# Patient Record
Sex: FEMALE | Race: WHITE | NOT HISPANIC OR LATINO | Employment: OTHER | URBAN - METROPOLITAN AREA
[De-identification: names, ages, dates, MRNs, and addresses within clinical notes are randomized per-mention and may not be internally consistent; named-entity substitution may affect disease eponyms.]

---

## 2023-09-22 ENCOUNTER — TELEPHONE (OUTPATIENT)
Dept: ENDOCRINOLOGY | Facility: HOSPITAL | Age: 43
End: 2023-09-22
Payer: COMMERCIAL

## 2023-10-09 ENCOUNTER — TELEPHONE (OUTPATIENT)
Dept: ENDOCRINOLOGY | Facility: CLINIC | Age: 43
End: 2023-10-09
Payer: COMMERCIAL

## 2023-10-09 NOTE — TELEPHONE ENCOUNTER
Most recent labs ordered have been pulled into system from Care everywhere. TSH 11.1. Results routed to Dr. Saúl Aceves LPN

## 2023-10-13 DIAGNOSIS — E03.9 HYPOTHYROIDISM, UNSPECIFIED TYPE: ICD-10-CM

## 2023-10-13 RX ORDER — POTASSIUM CHLORIDE 750 MG/1
10 TABLET, FILM COATED, EXTENDED RELEASE ORAL
COMMUNITY
Start: 2021-03-08 | End: 2023-11-27 | Stop reason: SDUPTHER

## 2023-10-13 RX ORDER — VIT C/E/ZN/COPPR/LUTEIN/ZEAXAN 250MG-90MG
25 CAPSULE ORAL DAILY
COMMUNITY
Start: 2021-03-08

## 2023-10-13 RX ORDER — CROMOLYN SODIUM 100 MG/5ML
15 SOLUTION, CONCENTRATE ORAL
COMMUNITY
Start: 2023-09-15 | End: 2023-10-19 | Stop reason: SDUPTHER

## 2023-10-13 RX ORDER — FLUDROCORTISONE ACETATE 0.1 MG/1
0.05 TABLET ORAL 2 TIMES DAILY
COMMUNITY
Start: 2018-12-03 | End: 2024-02-01 | Stop reason: SDUPTHER

## 2023-10-13 RX ORDER — PROPRANOLOL HYDROCHLORIDE 10 MG/1
10 TABLET ORAL 2 TIMES DAILY
COMMUNITY
Start: 2020-04-27

## 2023-10-13 RX ORDER — LEVOTHYROXINE SODIUM 75 UG/1
75 TABLET ORAL
Qty: 90 TABLET | Refills: 1 | Status: SHIPPED | OUTPATIENT
Start: 2023-10-13 | End: 2024-05-06

## 2023-10-13 RX ORDER — LEVOTHYROXINE SODIUM 75 UG/1
75 TABLET ORAL
COMMUNITY
Start: 2020-10-23 | End: 2023-10-13 | Stop reason: SDUPTHER

## 2023-10-13 NOTE — TELEPHONE ENCOUNTER
Spoke to Anastasia Plaza asking for new script for increased dose she spoke to provider about .. Script for 75 mcg of synthroid pended to provider . Sasha Aceves LPN

## 2023-10-18 ENCOUNTER — DOCUMENTATION (OUTPATIENT)
Dept: ENDOCRINOLOGY | Facility: CLINIC | Age: 43
End: 2023-10-18
Payer: COMMERCIAL

## 2023-10-18 PROBLEM — E06.3 HYPOTHYROIDISM DUE TO HASHIMOTO'S THYROIDITIS: Status: ACTIVE | Noted: 2019-01-24

## 2023-10-18 PROBLEM — E55.9 VITAMIN D DEFICIENCY: Status: ACTIVE | Noted: 2019-01-24

## 2023-10-18 PROBLEM — R42 DIZZINESS: Status: ACTIVE | Noted: 2023-10-18

## 2023-10-18 PROBLEM — R92.30 DENSE BREAST TISSUE: Status: ACTIVE | Noted: 2017-05-01

## 2023-10-18 PROBLEM — R53.1 GENERALIZED WEAKNESS: Status: ACTIVE | Noted: 2023-10-18

## 2023-10-18 PROBLEM — E03.8 HYPOTHYROIDISM DUE TO HASHIMOTO'S THYROIDITIS: Status: ACTIVE | Noted: 2019-01-24

## 2023-10-18 PROBLEM — R79.89 LOW VITAMIN D LEVEL: Status: ACTIVE | Noted: 2023-08-14

## 2023-10-18 PROBLEM — E03.9 ACQUIRED HYPOTHYROIDISM: Status: ACTIVE | Noted: 2023-10-18

## 2023-10-18 PROBLEM — N20.0 CALCULUS OF KIDNEY: Status: ACTIVE | Noted: 2019-01-24

## 2023-10-18 PROBLEM — M53.0 CERVICOCRANIAL SYNDROME: Status: ACTIVE | Noted: 2019-11-15

## 2023-10-18 PROBLEM — E04.1 THYROID NODULE: Status: ACTIVE | Noted: 2021-09-22

## 2023-10-18 PROBLEM — E83.42 HYPOMAGNESEMIA: Status: ACTIVE | Noted: 2018-11-19

## 2023-10-18 PROBLEM — G90.A POTS (POSTURAL ORTHOSTATIC TACHYCARDIA SYNDROME): Status: ACTIVE | Noted: 2023-10-18

## 2023-10-18 PROBLEM — Z86.39 HISTORY OF HASHIMOTO THYROIDITIS: Status: ACTIVE | Noted: 2023-10-18

## 2023-10-18 PROBLEM — R00.2 PALPITATIONS: Status: ACTIVE | Noted: 2023-10-18

## 2023-10-18 PROBLEM — E87.6 HYPOKALEMIA: Status: ACTIVE | Noted: 2018-11-19

## 2023-10-18 RX ORDER — ASPIRIN 325 MG
1 TABLET, DELAYED RELEASE (ENTERIC COATED) ORAL
COMMUNITY
Start: 2016-06-09 | End: 2023-10-19 | Stop reason: ALTCHOICE

## 2023-10-18 RX ORDER — ACETAMINOPHEN 500 MG
500 TABLET ORAL EVERY 8 HOURS PRN
COMMUNITY
Start: 2018-12-03

## 2023-10-18 RX ORDER — LEVOTHYROXINE SODIUM 50 UG/1
50 TABLET ORAL DAILY
COMMUNITY
Start: 2023-08-02 | End: 2023-10-19

## 2023-10-18 RX ORDER — FLUDROCORTISONE ACETATE 0.1 MG/1
0.1 TABLET ORAL DAILY
COMMUNITY
Start: 2019-11-27 | End: 2024-03-14 | Stop reason: SDUPTHER

## 2023-10-18 RX ORDER — CROMOLYN SODIUM 100 MG/5ML
10 SOLUTION, CONCENTRATE ORAL 4 TIMES DAILY
COMMUNITY

## 2023-10-18 NOTE — PROGRESS NOTES
We reviewed he rlabs   TSH high  She cannot tolerate genric  Needs to have synthorid /brand  75 mcg a day

## 2023-10-19 ENCOUNTER — LAB (OUTPATIENT)
Dept: LAB | Facility: LAB | Age: 43
End: 2023-10-19
Payer: COMMERCIAL

## 2023-10-19 ENCOUNTER — OFFICE VISIT (OUTPATIENT)
Dept: GASTROENTEROLOGY | Facility: CLINIC | Age: 43
End: 2023-10-19
Payer: COMMERCIAL

## 2023-10-19 VITALS — HEIGHT: 60 IN | BODY MASS INDEX: 18.69 KG/M2 | WEIGHT: 95.2 LBS

## 2023-10-19 DIAGNOSIS — R10.13 EPIGASTRIC PAIN: Primary | ICD-10-CM

## 2023-10-19 DIAGNOSIS — R19.7 DIARRHEA, UNSPECIFIED TYPE: ICD-10-CM

## 2023-10-19 DIAGNOSIS — R19.7 DIARRHEA, UNSPECIFIED TYPE: Primary | ICD-10-CM

## 2023-10-19 LAB
ALBUMIN SERPL BCP-MCNC: 4.4 G/DL (ref 3.4–5)
ALP SERPL-CCNC: 41 U/L (ref 33–110)
ALT SERPL W P-5'-P-CCNC: 8 U/L (ref 7–45)
ANION GAP SERPL CALC-SCNC: 13 MMOL/L (ref 10–20)
AST SERPL W P-5'-P-CCNC: 15 U/L (ref 9–39)
BILIRUB SERPL-MCNC: 0.5 MG/DL (ref 0–1.2)
BUN SERPL-MCNC: 19 MG/DL (ref 6–23)
CALCIUM SERPL-MCNC: 9.7 MG/DL (ref 8.6–10.6)
CHLORIDE SERPL-SCNC: 104 MMOL/L (ref 98–107)
CO2 SERPL-SCNC: 29 MMOL/L (ref 21–32)
CREAT SERPL-MCNC: 0.57 MG/DL (ref 0.5–1.05)
CRP SERPL-MCNC: <0.1 MG/DL
ERYTHROCYTE [DISTWIDTH] IN BLOOD BY AUTOMATED COUNT: 14.3 % (ref 11.5–14.5)
ERYTHROCYTE [SEDIMENTATION RATE] IN BLOOD BY WESTERGREN METHOD: 15 MM/H (ref 0–20)
GFR SERPL CREATININE-BSD FRML MDRD: >90 ML/MIN/1.73M*2
GLUCOSE SERPL-MCNC: 86 MG/DL (ref 74–99)
HCT VFR BLD AUTO: 41.5 % (ref 36–46)
HGB BLD-MCNC: 13 G/DL (ref 12–16)
LIPASE SERPL-CCNC: 56 U/L (ref 9–82)
MCH RBC QN AUTO: 31.3 PG (ref 26–34)
MCHC RBC AUTO-ENTMCNC: 31.3 G/DL (ref 32–36)
MCV RBC AUTO: 100 FL (ref 80–100)
NRBC BLD-RTO: 0 /100 WBCS (ref 0–0)
PLATELET # BLD AUTO: 332 X10*3/UL (ref 150–450)
PMV BLD AUTO: 10.6 FL (ref 7.5–11.5)
POTASSIUM SERPL-SCNC: 3.7 MMOL/L (ref 3.5–5.3)
PROT SERPL-MCNC: 7.5 G/DL (ref 6.4–8.2)
RBC # BLD AUTO: 4.16 X10*6/UL (ref 4–5.2)
SODIUM SERPL-SCNC: 142 MMOL/L (ref 136–145)
WBC # BLD AUTO: 7.9 X10*3/UL (ref 4.4–11.3)

## 2023-10-19 PROCEDURE — 86364 TISS TRNSGLTMNASE EA IG CLAS: CPT

## 2023-10-19 PROCEDURE — 85652 RBC SED RATE AUTOMATED: CPT

## 2023-10-19 PROCEDURE — 36415 COLL VENOUS BLD VENIPUNCTURE: CPT

## 2023-10-19 PROCEDURE — 84439 ASSAY OF FREE THYROXINE: CPT

## 2023-10-19 PROCEDURE — 99204 OFFICE O/P NEW MOD 45 MIN: CPT | Performed by: INTERNAL MEDICINE

## 2023-10-19 PROCEDURE — 1036F TOBACCO NON-USER: CPT | Performed by: INTERNAL MEDICINE

## 2023-10-19 PROCEDURE — 86258 DGP ANTIBODY EACH IG CLASS: CPT

## 2023-10-19 PROCEDURE — 83690 ASSAY OF LIPASE: CPT

## 2023-10-19 PROCEDURE — 82390 ASSAY OF CERULOPLASMIN: CPT

## 2023-10-19 PROCEDURE — 84443 ASSAY THYROID STIM HORMONE: CPT

## 2023-10-19 PROCEDURE — 85027 COMPLETE CBC AUTOMATED: CPT

## 2023-10-19 PROCEDURE — 80053 COMPREHEN METABOLIC PANEL: CPT

## 2023-10-19 PROCEDURE — 86140 C-REACTIVE PROTEIN: CPT

## 2023-10-19 NOTE — PROGRESS NOTES
"Subjective     History of Present Illness:   Anastasia Plaza is a 43 y.o. female with PMHx of Hashimoto's  who presents to GI clinic for initiation of care.   Also has h/o POTS, where her \"nervous system crashed\" in 2016.  Has \"high mast cell intolerance\".   Has seen \"functional doctors\", which \"screwed up (her) biome\". History of SIBO, also dx'd with \"Candida\".   BM's variable, has BM's on most days, no blood.   Last colonoscopy ([facility]) approx 7-8 years ago. \"Most of my tests have come back normal\", has FH of polyps.   \"A lot of nausea over the years\".  \"I have high histamine\".  Has had some decrease in appetite, lost approx. 10 pounds. \"I have some type of reaction to everything I put into my system\".   Sensitive to meds, needs \"microdoses\" of medications.  Has pains throughout her body (neck, abdomen, legs, etc.) after eating.   \"I got to the point where I was I wasn't able to walk\".     Past Medical History  As per HPI.     Social History  she  reports that she has never smoked. She has never been exposed to tobacco smoke. She has never used smokeless tobacco.     Family History  her family history includes Breast cancer in her cousin; Colon polyps in an other family member; Hypertension in her father and another family member.     Review of Systems  Review of Systems    Allergies  Allergies   Allergen Reactions    Levothyroxine Other     Palpitation with generic , can tolerate synthroid brand    Penicillins Other and Unknown     Reaction as a child    Childhood allergy    Sulfa (Sulfonamide Antibiotics) Rash    Sulfamethoxazole-Trimethoprim Hives and Rash       Medications  Current Outpatient Medications   Medication Instructions    acetaminophen (TYLENOL) 500 mg, oral, Every 8 hours PRN    BETAINE HCL ORAL oral, Daily, 0.5 to 0.75 tablet= dose     CHELATED ZINC ORAL 10 mg, oral, 2 times daily    cholecalciferol (VITAMIN D-3) 25 mcg, oral, Daily    cromolyn (Gastrocrom) 100 mg/5 mL solution 10 mL, oral, 4 times " daily    DIPHENHYDRAMINE HCL ORAL 10 mg, oral, Daily PRN    fludrocortisone (FLORINEF) 0.05 mg, oral, 2 times daily    fludrocortisone (FLORINEF) 0.1 mg, oral, Daily    levothyroxine (SYNTHROID, LEVOXYL) 75 mcg, oral, Daily before breakfast    naltrexone HCl (NALTREXONE ORAL) 1 mL, oral, Daily, Now taking 0.60 ml daily    NON FORMULARY oral, 2 times daily, trace elements ped Cr-Cu-Mn-Zn (Trace-4 PEDS) 1 mcg-0.1 mg-30 mcg-0.5 mg/mL solution<BR> Trace minerals 1-2 drops orally 1-2 times daily    potassium chloride CR 10 mEq ER tablet 10 mEq, oral, Daily RT    propranolol (INDERAL) 10 mg, oral, 2 times daily        Objective   There were no vitals taken for this visit.   118/72  Physical Exam  Neck - no mass  Lungs clear  CV rrr, no mrg  Abd flat soft nontender       Lab Results   Component Value Date    WBC 5.6 12/17/2021    WBC 7.7 12/03/2018    HGB 12.9 12/17/2021    HGB 13.2 12/03/2018    HCT 38.5 12/17/2021    HCT 40.3 12/03/2018     12/17/2021     12/03/2018     Lab Results   Component Value Date     12/17/2021     12/03/2018    K 3.7 12/17/2021    K 4.0 12/03/2018     12/17/2021     12/03/2018    CO2 29 12/17/2021    CO2 27 12/03/2018    BUN 23 12/17/2021    BUN 18 12/03/2018    CREATININE 0.68 12/17/2021    CREATININE 0.80 12/03/2018    CALCIUM 9.6 12/17/2021    CALCIUM 9.7 12/03/2018    PROT 7.7 12/17/2021    PROT 8.0 12/03/2018    PROT 8.0 12/03/2018    BILITOT 0.6 12/17/2021    BILITOT 0.5 12/03/2018    ALKPHOS 39 12/17/2021    ALKPHOS 38 12/03/2018    ALT 8 12/17/2021    ALT 9 12/03/2018    AST 16 12/17/2021    AST 15 12/03/2018    GLUCOSE 97 12/17/2021    GLUCOSE 116 (H) 12/03/2018           Anastasia Plaza is a 43 y.o. female who presents for initiation of care.   Complex PMH including POTS, mast cell disease, Hashimoto's, SIBO, etc.   ? If there is a unifying diagnosis?    Will check labs, stool studies         Heladio Sanford MD

## 2023-10-20 LAB
CERULOPLASMIN SERPL-MCNC: 19.9 MG/DL (ref 20–60)
GLIADIN PEPTIDE IGA SER IA-ACNC: <1 U/ML
GLIADIN PEPTIDE IGG SER IA-ACNC: <1 U/ML
TTG IGA SER IA-ACNC: <1 U/ML
TTG IGG SER IA-ACNC: <1 U/ML

## 2023-10-23 ENCOUNTER — TELEPHONE (OUTPATIENT)
Dept: GASTROENTEROLOGY | Facility: CLINIC | Age: 43
End: 2023-10-23
Payer: COMMERCIAL

## 2023-10-23 LAB
T4 FREE SERPL-MCNC: 1.43 NG/DL (ref 0.78–1.48)
TSH SERPL-ACNC: 11.69 MIU/L (ref 0.44–3.98)

## 2023-10-23 NOTE — TELEPHONE ENCOUNTER
----- Message from Jayleen Aquino MA sent at 10/23/2023  1:25 PM EDT -----  Please call she has questions concerning her stool studies that you ordered [phone]

## 2023-10-23 NOTE — TELEPHONE ENCOUNTER
Phone - reviewed blood tests - normal, x elevated TSH (endocrinologist is aware, adjusting her dose), and borderline low cerulosplasmin (? Significance?)

## 2023-10-24 DIAGNOSIS — R74.8 ELEVATED LIVER ENZYMES: Primary | ICD-10-CM

## 2023-10-25 ENCOUNTER — LAB (OUTPATIENT)
Dept: LAB | Facility: LAB | Age: 43
End: 2023-10-25
Payer: COMMERCIAL

## 2023-10-25 ENCOUNTER — APPOINTMENT (OUTPATIENT)
Dept: LAB | Facility: LAB | Age: 43
End: 2023-10-25
Payer: COMMERCIAL

## 2023-10-25 DIAGNOSIS — R10.13 EPIGASTRIC PAIN: ICD-10-CM

## 2023-10-25 DIAGNOSIS — R19.7 DIARRHEA, UNSPECIFIED TYPE: ICD-10-CM

## 2023-10-25 PROCEDURE — 83993 ASSAY FOR CALPROTECTIN FECAL: CPT

## 2023-10-25 PROCEDURE — 87329 GIARDIA AG IA: CPT

## 2023-10-25 PROCEDURE — 87449 NOS EACH ORGANISM AG IA: CPT

## 2023-10-25 PROCEDURE — 82653 EL-1 FECAL QUANTITATIVE: CPT

## 2023-10-25 PROCEDURE — 87328 CRYPTOSPORIDIUM AG IA: CPT

## 2023-10-25 PROCEDURE — 87506 IADNA-DNA/RNA PROBE TQ 6-11: CPT

## 2023-10-26 ENCOUNTER — TELEPHONE (OUTPATIENT)
Dept: ENDOCRINOLOGY | Facility: CLINIC | Age: 43
End: 2023-10-26
Payer: COMMERCIAL

## 2023-10-26 LAB

## 2023-10-26 NOTE — TELEPHONE ENCOUNTER
Spoke to Anastasia Plaza updating her on prior auth appeal , let her know I spoke to [facility] pharmacy reopened prior authorization  (pending auth # [id number])  provided additional information over the phone concerning need for increased dose , need for name brand SYNTHROID ( phone contact ID# [phone]) and faxed Labs for the last year , result note and clinical note regarding TSH.   Faxed to [facility] at number they provided [phone].

## 2023-10-28 LAB
CRYPTOSP AG STL QL IA: NEGATIVE
ELASTASE PANC STL-MCNT: >800 UG/G
G LAMBLIA AG STL QL IA: NEGATIVE
H PYLORI AG STL QL IA: NEGATIVE

## 2023-10-29 LAB — CALPROTECTIN STL-MCNT: 11 UG/G

## 2023-10-31 LAB — O+P STL MICRO: NEGATIVE

## 2023-11-03 NOTE — RESULT ENCOUNTER NOTE
Dear Anastasia:    The stool  tests are not suggestive of infection, inflammatory bowel disease, or exocrine pancreatic insufficiency.   Please let me know if your bowel issues persist.    Heladio Sanford Jr., MD

## 2023-11-07 ENCOUNTER — TELEPHONE (OUTPATIENT)
Dept: ENDOCRINOLOGY | Facility: CLINIC | Age: 43
End: 2023-11-07
Payer: COMMERCIAL

## 2023-11-07 NOTE — TELEPHONE ENCOUNTER
Spoke to [facility] pharmacy concerning prior authorization of Synthroid. [facility] noted that previous Rep that was assisting with prior auth did not include everything and more info was needed . More information was provided again and clinicals confirmed to have been received and expedited decision was requested . . Pending prior auth # [id number] AND CALL ID # [id number]. Prior auth response should be received by 11/08/2023. Sasha Aceves LPN

## 2023-11-09 ENCOUNTER — TELEPHONE (OUTPATIENT)
Dept: ENDOCRINOLOGY | Facility: CLINIC | Age: 43
End: 2023-11-09
Payer: COMMERCIAL

## 2023-11-09 NOTE — TELEPHONE ENCOUNTER
Spoke to pharmacist at [facility] verifying that  Anastasia Plaza   Should receive name brand Synthroid and that prior authorization has been approved . Sasha Aceves LPN

## 2023-11-27 DIAGNOSIS — E87.6 HYPOKALEMIA: ICD-10-CM

## 2023-11-27 RX ORDER — POTASSIUM CHLORIDE 750 MG/1
10 TABLET, FILM COATED, EXTENDED RELEASE ORAL
Qty: 90 TABLET | Refills: 3 | Status: SHIPPED | OUTPATIENT
Start: 2023-11-27

## 2023-11-30 ENCOUNTER — APPOINTMENT (OUTPATIENT)
Dept: DERMATOLOGY | Facility: CLINIC | Age: 43
End: 2023-11-30
Payer: COMMERCIAL

## 2023-12-04 DIAGNOSIS — R14.0 BLOATING: Primary | ICD-10-CM

## 2023-12-19 ENCOUNTER — TELEMEDICINE (OUTPATIENT)
Dept: ENDOCRINOLOGY | Facility: CLINIC | Age: 43
End: 2023-12-19
Payer: COMMERCIAL

## 2023-12-19 DIAGNOSIS — E03.8 HYPOTHYROIDISM DUE TO HASHIMOTO'S THYROIDITIS: Primary | ICD-10-CM

## 2023-12-19 DIAGNOSIS — G90.A POTS (POSTURAL ORTHOSTATIC TACHYCARDIA SYNDROME): ICD-10-CM

## 2023-12-19 DIAGNOSIS — E28.319 PREMATURE MENOPAUSE: ICD-10-CM

## 2023-12-19 DIAGNOSIS — E55.9 VITAMIN D DEFICIENCY: ICD-10-CM

## 2023-12-19 DIAGNOSIS — E06.3 HYPOTHYROIDISM DUE TO HASHIMOTO'S THYROIDITIS: Primary | ICD-10-CM

## 2023-12-19 DIAGNOSIS — E87.6 HYPOKALEMIA: ICD-10-CM

## 2023-12-19 PROCEDURE — 99214 OFFICE O/P EST MOD 30 MIN: CPT | Performed by: INTERNAL MEDICINE

## 2023-12-19 NOTE — PROGRESS NOTES
Consults    Reason For Consult  Hypothyroidism     History Of Present Illness  Anastasia Plaza is a 43 y.o. female presenting with hypothyroidism  Last period late summer  FSH elevated        hypothyroidism , POTS hypokalemia      she suffers POTS and hypothyroidism complicated due to GI absorption   due to involvement of her GI system from dysautonomia severe intolerance to medications    Very low vitamin D despite taking her supplementations     weight underweight  palpitations on and off  fatigue   needs Synthroid , had n allergic reaction to non generic.    potassium and TSH needs to be repeated in 2022    also , she is on [place] F for POTS   the control for hypothyroidism imroved     previous:   her POTS and hyperandrogenic symptoms with the thyroid replacement, we had to go very slow and she had reaction to non generic ( tachycardia, POTS /neurogenic symptoms )     she is working with Dr Rodriguez at [facility] , she also is doing some alternative medicine     she is still having reactions to increase on her medication         Active Problems  Problems    · Acquired hypothyroidism (244.9) (E03.9)   · Dizziness (780.4) (R42)   · Generalized weakness (780.79) (R53.1)   · History of Hashimoto thyroiditis (V12.29) (Z86.39)   · Hypokalemia (276.8) (E87.6)   · Hypomagnesemia (275.2) (E83.42)   · POTS (postural orthostatic tachycardia syndrome) (427.89) (G90.A)   · Vitamin D deficiency (268.9) (E55.9)    Past Medical History  Problems    · Acquired hypothyroidism (244.9) (E03.9)   · History of acute sinusitis (V12.69) (Z87.09)   · Resolved Date: 2016   · History of nausea (V12.79) (Z87.898)   · Resolved Date: 2016   · History of paroxysmal supraventricular tachycardia (V12.59) (Z86.79)   · History of viral infection (V12.09) (Z86.19)   · Resolved Date: 2016    Surgical History  Problems    · History of  Section   · History of Dilation And Curettage    Family History  Father    · Family  history of hypertension (V17.49) (Z82.49)  Aunt    · Family history of hypertension (V17.49) (Z82.49)  Cousin    · Family history of malignant neoplasm of breast (V16.3) (Z80.3)  Multiple Family Members    · Family history of colonic polyps (V18.51) (Z83.71)    Social History  Problems    · No alcohol use   · No caffeine use   · Non-smoker (V49.89) (Z78.9)    Allergies  Medication    · Bactrim  Recorded By: Mahsa Preston; 2015 2:30:06 PM   · levothyroxine  Recorded By: Anna Gomez; 3/3/2021 1:57:06 PM   · Penicillins  Recorded By: Mahsa Preston; 2015 2:30:06 PM      Any family history of thyroid cancer? no  Any personal history of radiation to your head/neck? no    Past Medical History  She has a past medical history of Hashimoto's thyroiditis, Hypothyroidism, Hypothyroidism, unspecified (2022), Personal history of other diseases of the circulatory system, Personal history of other diseases of the respiratory system (2015), Personal history of other infectious and parasitic diseases (2015), Personal history of other specified conditions (2015), Thyroid nodule, and Vitamin D deficiency.    Surgical History  She has a past surgical history that includes  section, classic (2016); Dilation and curettage of uterus (2016); and [place] tooth extraction.     Social History  She reports that she has never smoked. She has never been exposed to tobacco smoke. She has never used smokeless tobacco. She reports that she does not currently use alcohol. She reports that she does not use drugs.    Family History  Family History   Problem Relation Name Age of Onset    Hypertension Father      Colon polyps Other mult family members     Hypertension Other Aunt     Breast cancer Cousin      Prostate cancer Maternal Grandfather N/A     Breast cancer Maternal Grandmother N/A     Thyroid disease Maternal Grandmother N/A     Breast cancer Mother's Sister N/A     Breast cancer  Father's Sister N/A         Allergies  Levothyroxine, Penicillins, Sulfa (sulfonamide antibiotics), and Sulfamethoxazole-trimethoprim    Review of Systems    Past Medical History:   Diagnosis Date    Hashimoto's thyroiditis     Hypothyroidism     Hypothyroidism, unspecified 2022    Acquired hypothyroidism    Personal history of other diseases of the circulatory system     History of paroxysmal supraventricular tachycardia    Personal history of other diseases of the respiratory system 2015    History of acute sinusitis    Personal history of other infectious and parasitic diseases 2015    History of viral infection    Personal history of other specified conditions 2015    History of nausea    Thyroid nodule     Vitamin D deficiency        Past Surgical History:   Procedure Laterality Date     SECTION, CLASSIC  2016     Section    DILATION AND CURETTAGE OF UTERUS  2016    Dilation And Curettage    WISDOM TOOTH EXTRACTION         Social History     Socioeconomic History    Marital status: Single     Spouse name: Not on file    Number of children: Not on file    Years of education: Not on file    Highest education level: Not on file   Occupational History    Not on file   Tobacco Use    Smoking status: Never     Passive exposure: Never    Smokeless tobacco: Never   Substance and Sexual Activity    Alcohol use: Not Currently    Drug use: Never    Sexual activity: Yes     Partners: Male   Other Topics Concern    Not on file   Social History Narrative    Not on file     Social Determinants of Health     Financial Resource Strain: Not on file   Food Insecurity: Not on file   Transportation Needs: Not on file   Physical Activity: Not on file   Stress: Not on file   Social Connections: Not on file   Intimate Partner Violence: Not on file   Housing Stability: Not on file        Physical Exam     ROS, PMH, FH/SH, surgical history and allergies have been reviewed.    Last  "Recorded Vitals  There were no vitals taken for this visit.    Relevant Results         If you would like to pull in Medications, type .meds     If you would like to pull in Lab results for the last 24 hours, type .[id number]    If you would like to pull in specific Lab results, type .ll     If you would like to pull in Imaging results, type .imgrslt :99}  Lab Review  Lab Results   Component Value Date    BILITOT 0.5 10/19/2023    CALCIUM 9.7 10/19/2023    CO2 29 10/19/2023     10/19/2023    CREATININE 0.57 10/19/2023    GLUCOSE 86 10/19/2023    ALKPHOS 41 10/19/2023    K 3.7 10/19/2023    PROT 7.5 10/19/2023     10/19/2023    AST 15 10/19/2023    ALT 8 10/19/2023    BUN 19 10/19/2023    ANIONGAP 13 10/19/2023    ALBUMIN 4.4 10/19/2023    LIPASE 56 10/19/2023     No results found for: \"TRIG\", \"CHOL\", \"LDLCALC\", \"HDL\"  No results found for: \"HGBA1C\"  The ASCVD Risk score (Shanel DK, et al., 2019) failed to calculate for the following reasons:    The systolic blood pressure is missing    Cannot find a previous HDL lab    Cannot find a previous total cholesterol lab       Assessment/Plan   Problem List Items Addressed This Visit             ICD-10-CM    Hypokalemia E87.6    Relevant Orders    Thyroxine, Free    Triiodothyronine, Free    Thyroid Stimulating Hormone    Magnesium    Zinc    Basic metabolic panel    Vitamin D 25-Hydroxy,Total (for eval of Vitamin D levels)    Hypothyroidism due to Hashimoto's thyroiditis - Primary E03.8, E06.3    Relevant Orders    Thyroxine, Free    Triiodothyronine, Free    Thyroid Stimulating Hormone    Magnesium    Zinc    Basic metabolic panel    Vitamin D 25-Hydroxy,Total (for eval of Vitamin D levels)    POTS (postural orthostatic tachycardia syndrome) G90.A    Relevant Orders    Thyroxine, Free    Triiodothyronine, Free    Thyroid Stimulating Hormone    Magnesium    Zinc    Basic metabolic panel    Vitamin D 25-Hydroxy,Total (for eval of Vitamin D levels)    Vitamin D " deficiency E55.9    Relevant Orders    Referral to Dermatology    Thyroxine, Free    Triiodothyronine, Free    Thyroid Stimulating Hormone    Magnesium    Zinc    Basic metabolic panel    Vitamin D 25-Hydroxy,Total (for eval of Vitamin D levels)     Other Visit Diagnoses         Codes    Premature menopause     E28.319    Relevant Orders    Thyroxine, Free    Triiodothyronine, Free    Thyroid Stimulating Hormone    Magnesium    Zinc    Basic metabolic panel    Vitamin D 25-Hydroxy,Total (for eval of Vitamin D levels)                 Assessed    · POTS (postural orthostatic tachycardia syndrome) (427.89) (G90.A)   · Hypomagnesemia (275.2) (E83.42)   · Hypokalemia (276.8) (E87.6)   · History of Hashimoto thyroiditis (V12.29) (Z86.39)   · Acquired hypothyroidism (244.9) (E03.9)           Acquired hypothyroidism (244.9) (E03.9)       improved control  and absorption better  we will lower thyroid dose to 75 mcg   TSH 6.5           she has difficulty tolerating her medication and needed constant adjustment        she has to be on synthroid GITA due to intolerance to additives in different generic ( severe episodes of tachycardia, worsening of POTS)        POTS (postural orthostatic tachycardia syndrome) (427.89) (I49.8)       This is managed by Dr Rodriguez and myself, I provide florin F prescription to raise blood       pressure       her GI symptoms are not making management easy       History of Hashimoto thyroiditis (V12.29) (Z86.39)             Hypokalemia (276.8) (E87.6)       part of her dysautonomia   on replacement , levels stable      we are providing her potassium supplements and frequent monitoring to help        Very low vitamin D     hypomagnesemia (275.2) (E83.42) And hypokalemia stable       also part of POTS  , low vitamin d continue replacement         I spent a total of 30+ minutes on the date of the service which included preparing to see the patient, face-to-face patient care, completing clinical  documentation, obtaining and / or reviewing separately obtained history, counseling and educating the patient/family/caregiver, ordering medications, tests, or procedures, communicating with other healthcare providers (not separately reported), independently interpreting results, not separately reported, and communicating results to the patient/family/caregiver, real-time telemedicine visit using audiovisual technology with patient's verbal consent. Name and date of birth verified.        Brice Hays MD

## 2023-12-20 ENCOUNTER — TELEPHONE (OUTPATIENT)
Dept: DERMATOLOGY | Facility: CLINIC | Age: 43
End: 2023-12-20
Payer: COMMERCIAL

## 2023-12-21 ENCOUNTER — TELEPHONE (OUTPATIENT)
Dept: GASTROENTEROLOGY | Facility: CLINIC | Age: 43
End: 2023-12-21
Payer: COMMERCIAL

## 2023-12-21 DIAGNOSIS — K90.9 INTESTINAL MALABSORPTION, UNSPECIFIED TYPE (HHS-HCC): Primary | ICD-10-CM

## 2023-12-21 NOTE — TELEPHONE ENCOUNTER
----- Message from Jayleen Aquino MA sent at 12/21/2023  2:30 PM EST -----  Patient called has a breath test set up she has been working with other doctors as well! She wanted to talk with you about an absorption issues and would like to speak with you P# [phone]

## 2023-12-21 NOTE — TELEPHONE ENCOUNTER
"Phone - she ? If she is absorbing nutritents - now in \"full blown menopause\" - she s/w endocrinologist - has lost weight, zinc level is low, Vit D is low (despite 2000 international units daily) - I rec: check labs   "

## 2023-12-29 ENCOUNTER — APPOINTMENT (OUTPATIENT)
Dept: GASTROENTEROLOGY | Facility: HOSPITAL | Age: 43
End: 2023-12-29
Payer: COMMERCIAL

## 2024-01-04 ENCOUNTER — PROCEDURE VISIT (OUTPATIENT)
Dept: GASTROENTEROLOGY | Facility: CLINIC | Age: 44
End: 2024-01-04
Payer: COMMERCIAL

## 2024-01-04 DIAGNOSIS — R14.0 BLOATING: ICD-10-CM

## 2024-01-04 PROCEDURE — 91065 BREATH HYDROGEN/METHANE TEST: CPT | Performed by: INTERNAL MEDICINE

## 2024-01-04 NOTE — PROGRESS NOTES
Patient ID: Anastasia Plaza is a 43 y.o. female.    Breath Hydrogen Test-Bacterial Overgrowth    Date/Time: 1/4/2024 12:10 PM    Performed by: Steph Byrnes RN  Authorized by: Heladio Sanford MD    Consent:     Consent obtained:  Verbal    Consent given by:  Patient  Universal protocol:     Procedure explained and questions answered to patient or proxy's satisfaction: yes      Patient identity confirmed:  Verbally with patient  Sedation:     Sedation type:  None  Anesthesia:     Anesthesia method:  None  Post-procedure details:     Procedure completion:  Tolerated with difficulty  Hydrogen Breath Analysis Consultation Sheet    Referring Provider: Heladio Sanford Md  [address] [facility], [address]  [place] [id number]    Indication: R/O SIBO    Symptoms: Bloating    Age: 43 y.o.  Weight: There were no vitals filed for this visit.  Substrate: Glucose   Dose: 75 gms    Last Meal: 1/4 21:30  Recent Antibiotics: No    RESULTS:   Time PPM (H2) APPM* (CH4) CO2 Correction   Baseline #1 9:36 7 5 9.8 0.57   Baseline #2 9:38 7 5 10.7 0.52   *Challenge Dose Sugar: 9:50   Patient took 12 minutes to drink substrate. She was unable to finish it and only drank half of it.    15' 10:00 5 5 10.5 0.53   30' 10:15 4 5 9.2 0.60   45' 10:30 3 5 8.1 0.68   60' 10:45 3 4 9.3 0.60   75' 11:00 3 5 8.2 0.68   90' 11:15 3 4 9.8 0.57   105' 11:30 3 4 10.7 0.52   120'        135'        150'        165'        180'          Impression: Negative

## 2024-01-05 ENCOUNTER — LAB (OUTPATIENT)
Dept: LAB | Facility: LAB | Age: 44
End: 2024-01-05
Payer: COMMERCIAL

## 2024-01-05 DIAGNOSIS — K90.9 INTESTINAL MALABSORPTION, UNSPECIFIED TYPE (HHS-HCC): ICD-10-CM

## 2024-01-05 LAB
ALBUMIN SERPL BCP-MCNC: 4.2 G/DL (ref 3.4–5)
ALP SERPL-CCNC: 39 U/L (ref 33–110)
ALT SERPL W P-5'-P-CCNC: 19 U/L (ref 7–45)
ANION GAP SERPL CALC-SCNC: 11 MMOL/L (ref 10–20)
AST SERPL W P-5'-P-CCNC: 31 U/L (ref 9–39)
BILIRUB SERPL-MCNC: 0.6 MG/DL (ref 0–1.2)
BUN SERPL-MCNC: 22 MG/DL (ref 6–23)
CALCIUM SERPL-MCNC: 9.4 MG/DL (ref 8.6–10.6)
CHLORIDE SERPL-SCNC: 106 MMOL/L (ref 98–107)
CO2 SERPL-SCNC: 28 MMOL/L (ref 21–32)
CREAT SERPL-MCNC: 0.58 MG/DL (ref 0.5–1.05)
GFR SERPL CREATININE-BSD FRML MDRD: >90 ML/MIN/1.73M*2
GLUCOSE SERPL-MCNC: 98 MG/DL (ref 74–99)
POTASSIUM SERPL-SCNC: 4 MMOL/L (ref 3.5–5.3)
PREALB SERPL-MCNC: 20.2 MG/DL (ref 18–40)
PROT SERPL-MCNC: 7 G/DL (ref 6.4–8.2)
SODIUM SERPL-SCNC: 141 MMOL/L (ref 136–145)
VIT B12 SERPL-MCNC: 360 PG/ML (ref 211–911)

## 2024-01-05 PROCEDURE — 86364 TISS TRNSGLTMNASE EA IG CLAS: CPT

## 2024-01-05 PROCEDURE — 86258 DGP ANTIBODY EACH IG CLASS: CPT

## 2024-01-05 PROCEDURE — 86364 TISS TRNSGLTMNASE EA IG CLAS: CPT | Performed by: INTERNAL MEDICINE

## 2024-01-05 PROCEDURE — 80053 COMPREHEN METABOLIC PANEL: CPT

## 2024-01-05 PROCEDURE — 82380 ASSAY OF CAROTENE: CPT

## 2024-01-05 PROCEDURE — 84134 ASSAY OF PREALBUMIN: CPT

## 2024-01-05 PROCEDURE — 36415 COLL VENOUS BLD VENIPUNCTURE: CPT

## 2024-01-05 PROCEDURE — 86258 DGP ANTIBODY EACH IG CLASS: CPT | Performed by: INTERNAL MEDICINE

## 2024-01-05 PROCEDURE — 82607 VITAMIN B-12: CPT

## 2024-01-08 LAB
CAROTENE SERPL-MCNC: 92 UG/DL (ref 60–200)
GLIADIN PEPTIDE IGA SER IA-ACNC: NORMAL
GLIADIN PEPTIDE IGG SER IA-ACNC: <1 U/ML
TTG IGA SER IA-ACNC: NORMAL
TTG IGG SER IA-ACNC: <1 U/ML

## 2024-01-11 LAB
SCAN RESULT: NORMAL
SCAN RESULT: NORMAL

## 2024-02-01 ENCOUNTER — OFFICE VISIT (OUTPATIENT)
Dept: GASTROENTEROLOGY | Facility: CLINIC | Age: 44
End: 2024-02-01
Payer: COMMERCIAL

## 2024-02-01 VITALS — HEIGHT: 60 IN | WEIGHT: 96 LBS | HEART RATE: 102 BPM | BODY MASS INDEX: 18.85 KG/M2 | OXYGEN SATURATION: 99 %

## 2024-02-01 DIAGNOSIS — R53.1 GENERALIZED WEAKNESS: Primary | ICD-10-CM

## 2024-02-01 PROCEDURE — 1036F TOBACCO NON-USER: CPT | Performed by: INTERNAL MEDICINE

## 2024-02-01 PROCEDURE — 99214 OFFICE O/P EST MOD 30 MIN: CPT | Performed by: INTERNAL MEDICINE

## 2024-02-01 NOTE — PROGRESS NOTES
"Subjective     History of Present Illness:   Anastasia Plaza is a 43 y.o. female here for follow up.    \"Making progress\"  - appetite is better, has gained weight.   Eating some dairy, gluten free breads, trying to \"calm down the system\".   Adjusting cromolyn dose, naltrexone dose - somewhat more active -     Trying to increase vitamin D - does not tolerate large doses - considering light therapy -     Review of Systems  Review of Systems    Allergies  Allergies   Allergen Reactions    Levothyroxine Other     Palpitation with generic , can tolerate synthroid brand    Penicillins Other and Unknown     Reaction as a child    Childhood allergy    Sulfa (Sulfonamide Antibiotics) Rash    Sulfamethoxazole-Trimethoprim Hives and Rash       Medications  Current Outpatient Medications   Medication Instructions    acetaminophen (TYLENOL) 500 mg, oral, Every 8 hours PRN    BETAINE HCL ORAL oral, Daily, 0.5 to 0.75 tablet= dose     CHELATED ZINC ORAL 10 mg, oral, 2 times daily    cholecalciferol (VITAMIN D-3) 25 mcg, oral, Daily    cromolyn (Gastrocrom) 100 mg/5 mL solution 10 mL, oral, 4 times daily    DIPHENHYDRAMINE HCL ORAL 10 mg, oral, Daily PRN    fludrocortisone (FLORINEF) 0.1 mg, oral, Daily    levothyroxine (SYNTHROID, LEVOXYL) 75 mcg, oral, Daily before breakfast    naltrexone HCl (NALTREXONE ORAL) 1 mL, oral, Daily, Now taking 0.60 ml daily    NON FORMULARY oral, 2 times daily, trace elements ped Cr-Cu-Mn-Zn (Trace-4 PEDS) 1 mcg-0.1 mg-30 mcg-0.5 mg/mL solution<BR> Trace minerals 1-2 drops orally 1-2 times daily    potassium chloride CR 10 mEq ER tablet 10 mEq, oral, Daily RT    propranolol (INDERAL) 10 mg, oral, 2 times daily        Objective   Visit Vitals  Pulse 102      Physical Exam    112/74    Lungs - clear  CV - rrr, no mrg  Abd flat soft nontender       Lab Results   Component Value Date    WBC 7.9 10/19/2023    WBC 5.6 12/17/2021    WBC 7.7 12/03/2018    HGB 13.0 10/19/2023    HGB 12.9 12/17/2021    HGB 13.2 " 12/03/2018    HCT 41.5 10/19/2023    HCT 38.5 12/17/2021    HCT 40.3 12/03/2018     10/19/2023     12/17/2021     12/03/2018     Lab Results   Component Value Date     01/05/2024     10/19/2023     12/17/2021    K 4.0 01/05/2024    K 3.7 10/19/2023    K 3.7 12/17/2021     01/05/2024     10/19/2023     12/17/2021    CO2 28 01/05/2024    CO2 29 10/19/2023    CO2 29 12/17/2021    BUN 22 01/05/2024    BUN 19 10/19/2023    BUN 23 12/17/2021    CREATININE 0.58 01/05/2024    CREATININE 0.57 10/19/2023    CREATININE 0.68 12/17/2021    CALCIUM 9.4 01/05/2024    CALCIUM 9.7 10/19/2023    CALCIUM 9.6 12/17/2021    PROT 7.0 01/05/2024    PROT 7.5 10/19/2023    PROT 7.7 12/17/2021    BILITOT 0.6 01/05/2024    BILITOT 0.5 10/19/2023    BILITOT 0.6 12/17/2021    ALKPHOS 39 01/05/2024    ALKPHOS 41 10/19/2023    ALKPHOS 39 12/17/2021    ALT 19 01/05/2024    ALT 8 10/19/2023    ALT 8 12/17/2021    AST 31 01/05/2024    AST 15 10/19/2023    AST 16 12/17/2021    GLUCOSE 98 01/05/2024    GLUCOSE 86 10/19/2023    GLUCOSE 97 12/17/2021     Breath Hydrogen Test-Bacterial Overgrowth  Christiano Smalls MD     1/4/2024  2:12 PM  Breath Hydrogen Test-Bacterial Overgrowth    Date/Time: 1/4/2024 12:10 PM    Performed by: Steph Byrnes RN  Authorized by: Heladio Sanford MD    Consent:     Consent obtained:  Verbal    Consent given by:  Patient  Universal protocol:     Procedure explained and questions answered to patient or proxy's   satisfaction: yes      Patient identity confirmed:  Verbally with patient  Sedation:     Sedation type:  None  Anesthesia:     Anesthesia method:  None  Post-procedure details:     Procedure completion:  Tolerated with difficulty           Anastasia Bola is a 43 y.o. female for follow up of gluten sensitivity, dysautonomia, h/o SIBO.  Overall doing better, liberalizing diet - encouraged gradual increase in activity - emphasized importance of rest       Heladio  LEO Sanford MD

## 2024-02-08 ENCOUNTER — DOCUMENTATION (OUTPATIENT)
Dept: GASTROENTEROLOGY | Facility: CLINIC | Age: 44
End: 2024-02-08
Payer: COMMERCIAL

## 2024-02-09 ENCOUNTER — OFFICE VISIT (OUTPATIENT)
Dept: DERMATOLOGY | Facility: CLINIC | Age: 44
End: 2024-02-09
Payer: COMMERCIAL

## 2024-02-09 DIAGNOSIS — D18.01 HEMANGIOMA OF SKIN: ICD-10-CM

## 2024-02-09 DIAGNOSIS — D22.5 MELANOCYTIC NEVUS OF TRUNK: ICD-10-CM

## 2024-02-09 DIAGNOSIS — L70.0 ACNE VULGARIS: ICD-10-CM

## 2024-02-09 DIAGNOSIS — D48.5 NEOPLASM OF UNCERTAIN BEHAVIOR OF SKIN: Primary | ICD-10-CM

## 2024-02-09 DIAGNOSIS — L57.8 DIFFUSE PHOTODAMAGE OF SKIN: ICD-10-CM

## 2024-02-09 PROCEDURE — 11301 SHAVE SKIN LESION 0.6-1.0 CM: CPT | Performed by: DERMATOLOGY

## 2024-02-09 PROCEDURE — 1036F TOBACCO NON-USER: CPT | Performed by: DERMATOLOGY

## 2024-02-09 PROCEDURE — 88305 TISSUE EXAM BY PATHOLOGIST: CPT | Performed by: DERMATOLOGY

## 2024-02-09 PROCEDURE — 99204 OFFICE O/P NEW MOD 45 MIN: CPT | Performed by: DERMATOLOGY

## 2024-02-09 RX ORDER — CLINDAMYCIN PHOSPHATE 10 UG/ML
LOTION TOPICAL 2 TIMES DAILY
Qty: 60 ML | Refills: 11 | Status: SHIPPED | OUTPATIENT
Start: 2024-02-09 | End: 2025-02-08

## 2024-02-09 ASSESSMENT — DERMATOLOGY PATIENT ASSESSMENT
DO YOU HAVE IRREGULAR MENSTRUAL CYCLES: NO
DO YOU USE SUNSCREEN: OCCASIONALLY
ARE YOU TRYING TO GET PREGNANT: NO
DO YOU USE A TANNING BED: NO
DO YOU HAVE ANY NEW OR CHANGING LESIONS: NO
ARE YOU ON BIRTH CONTROL: NO

## 2024-02-09 ASSESSMENT — DERMATOLOGY QUALITY OF LIFE (QOL) ASSESSMENT: ARE THERE EXCLUSIONS OR EXCEPTIONS FOR THE QUALITY OF LIFE ASSESSMENT: NO

## 2024-02-09 NOTE — PROGRESS NOTES
Moira Plaza is a 44 y.o. female who presents for the following: Skin Exam.  She states she has not noticed any changes in any of her brown spots recently, including in size, shape, or color, and they are all asymptomatic with no associated bleeding, itching, or burning.  She also notes intermittent pimple breakouts on her face.    Review of Systems:  No other skin or systemic complaints other than what is documented elsewhere in the note.    The following portions of the chart were reviewed this encounter and updated as appropriate:       Skin Cancer History  No skin cancer on file.    Specialty Problems    None      Past Dermatologic / Past Relevant Medical History:    No history of atypical nevi or skin cancer    Family History:    Daughter melanoma    Social History:    The patient's daughter, Kenzie, is a patient in our office as well    Allergies:  Levothyroxine, Penicillins, Sulfa (sulfonamide antibiotics), and Sulfamethoxazole-trimethoprim    Current Medications / CAM's:    Current Outpatient Medications:     acetaminophen (Tylenol) 500 mg tablet, Take 1 tablet (500 mg) by mouth every 8 hours if needed (pain)., Disp: , Rfl:     BETAINE HCL ORAL, Take by mouth once daily. 0.5 to 0.75 tablet= dose, Disp: , Rfl:     CHELATED ZINC ORAL, Take 10 mg by mouth twice a day., Disp: , Rfl:     cholecalciferol (Vitamin D-3) 25 MCG (1000 UT) capsule, Take 1 capsule (25 mcg) by mouth once daily., Disp: , Rfl:     clindamycin (Cleocin T) 1 % lotion, Apply topically 2 times a day., Disp: 60 mL, Rfl: 11    cromolyn (Gastrocrom) 100 mg/5 mL solution, Take 10 mL (200 mg) by mouth 4 times a day., Disp: , Rfl:     DIPHENHYDRAMINE HCL ORAL, Take 10 mg by mouth once daily as needed (for histamine response, itchy, pain, clogging of throat etc.)., Disp: , Rfl:     fludrocortisone (Florinef) 0.1 mg tablet, Take 1 tablet (0.1 mg) by mouth once daily., Disp: , Rfl:     levothyroxine (Synthroid, Levoxyl) 75 mcg tablet,  Take 1 tablet (75 mcg) by mouth once daily in the morning. Take before meals., Disp: 90 tablet, Rfl: 1    naltrexone HCl (NALTREXONE ORAL), Take 1 mL by mouth once daily. Now taking 0.60 ml daily, Disp: , Rfl:     NON FORMULARY, Take by mouth 2 times a day. trace elements ped Cr-Cu-Mn-Zn (Trace-4 PEDS) 1 mcg-0.1 mg-30 mcg-0.5 mg/mL solution  Trace minerals 1-2 drops orally 1-2 times daily, Disp: , Rfl:     potassium chloride CR 10 mEq ER tablet, Take 1 tablet (10 mEq) by mouth once daily., Disp: 90 tablet, Rfl: 3    propranolol (Inderal) 10 mg tablet, Take 1 tablet (10 mg) by mouth 2 times a day., Disp: , Rfl:      Objective   Well appearing patient in no apparent distress; mood and affect are within normal limits.    A full examination was performed including scalp, face, eyes, ears, nose, lips, neck, chest, axillae, abdomen, back, bilateral upper extremities, and bilateral lower extremities. All findings within normal limits unless otherwise noted below.    Assessment/Plan   1. Neoplasm of uncertain behavior of skin (2)  Right Lateral Mid-Arm  5 mm dark brown pigmented, asymmetric macule with an asymmetric pigment network and irregular borders           Shave removal    Lesion length (cm):  0.5  Margin per side (cm):  0.2  Lesion diameter (cm):  0.9  Informed consent: discussed and consent obtained    Timeout: patient name, date of birth, surgical site, and procedure verified    Procedure prep:  Patient was prepped and draped  Anesthesia: the lesion was anesthetized in a standard fashion    Anesthetic:  1% lidocaine w/ epinephrine 1-100,000 local infiltration  Instrument used: flexible razor blade    Hemostasis achieved with: aluminum chloride    Outcome: patient tolerated procedure well    Post-procedure details: sterile dressing applied and wound care instructions given    Dressing type: bandage and petrolatum      Staff Communication: Dermatology Local Anesthesia: 1 % Lidocaine / Epinephrine -  Amount:0.5ml    Specimen 1 - Dermatopathology- DERM LAB  Differential Diagnosis: DN  Check Margins Yes/No?:    Comments:    Dermpath Lab: Routine Histopathology (formalin-fixed tissue)    Left Lateral Upper Back  5 mm dark brown pigmented, asymmetric macule with an asymmetric pigment network and irregular borders           Shave removal    Lesion length (cm):  0.5  Margin per side (cm):  0.2  Lesion diameter (cm):  0.9  Informed consent: discussed and consent obtained    Timeout: patient name, date of birth, surgical site, and procedure verified    Procedure prep:  Patient was prepped and draped  Anesthesia: the lesion was anesthetized in a standard fashion    Anesthetic:  1% lidocaine w/ epinephrine 1-100,000 local infiltration  Instrument used: flexible razor blade    Hemostasis achieved with: aluminum chloride    Outcome: patient tolerated procedure well    Post-procedure details: sterile dressing applied and wound care instructions given    Dressing type: bandage and petrolatum      Staff Communication: Dermatology Local Anesthesia: 1 % Lidocaine / Epinephrine - Amount:0.5ml    Specimen 2 - Dermatopathology- DERM LAB  Differential Diagnosis: DN  Check Margins Yes/No?:    Comments:    Dermpath Lab: Routine Histopathology (formalin-fixed tissue)    2. Melanocytic nevus of trunk  Scattered on the patient's face, neck, trunk, and extremities, there are multiple small, round- to oval-shaped, brown-pigmented and pink-colored, symmetric, uniform-appearing macules and dome-shaped papules    Clinically benign- to slightly atypical-appearing nevi - the clinically benign- to slightly atypical-appearing nature of the remainder of the patient's nevi was discussed with the patient today.  None of the patient's nevi, with the exception of the 2 noted above, meet threshold for biopsy today.  I emphasized the importance of performing monthly self-skin exams using the ABCDs of monitoring moles, which were reviewed with the  patient today and an informational hand-out provided.  I also emphasized the importance of sun avoidance and sun protection with daily sunscreen use.    3. Hemangioma of skin  Scattered on the patient's face, neck, trunk, and extremities, there are multiple small, round, cherry red- to purplish-colored, symmetric, uniform, vascular-appearing macules and papules    Cherry Angiomas - the benign nature of these vascular lesions was discussed with the patient today and reassurance provided.  No treatment is medically indicated for these lesions at this time.    4. Acne vulgaris  Head - Anterior (Face)  Scattered on the patient's face, there are a few open and closed comedones; several erythematous, inflammatory papules and pustules; and several pink to hyperpigmented macules consistent with post-inflammatory hyperpigmentation    Acne Vulgaris -face; mild; mainly inflammatory type.  The nature of this condition and treatment options were discussed extensively with the patient today.  At this time, I recommend topical therapy with Clindamycin 1% lotion, which she was instructed to apply twice daily to the affected areas of her face or up to 3-4 times per day as needed for active lesions.  The risks, benefits, and side effects of this medication were discussed.  I also informed the patient it will likely take at least 2-3 months to notice any significant improvement with the treatment regimen outlined above.  She expressed understanding and is in agreement with this plan.    clindamycin (Cleocin T) 1 % lotion - Head - Anterior (Face)  Apply topically 2 times a day.    5. Diffuse photodamage of skin  Photodistributed  Diffuse photodamage with actinic changes with telangiectasia and mottled pigmentation in sun-exposed areas.    Family history of melanoma and photodamage.  The signs and symptoms of skin cancer were reviewed and the patient was advised to practice sun protection and sun avoidance, use daily sunscreen, and  perform regular self skin exams.  Sun protection was discussed, including avoiding the mid-day sun, wearing a sunscreen with SPF at least 50, and stressing the need for reapplication of sunscreen and applying more than they think they need.

## 2024-02-15 ENCOUNTER — TELEPHONE (OUTPATIENT)
Dept: DERMATOLOGY | Facility: CLINIC | Age: 44
End: 2024-02-15
Payer: COMMERCIAL

## 2024-02-15 LAB
LABORATORY COMMENT REPORT: NORMAL
PATH REPORT.FINAL DX SPEC: NORMAL
PATH REPORT.GROSS SPEC: NORMAL
PATH REPORT.MICROSCOPIC SPEC OTHER STN: NORMAL
PATH REPORT.RELEVANT HX SPEC: NORMAL
PATH REPORT.TOTAL CANCER: NORMAL

## 2024-02-15 NOTE — TELEPHONE ENCOUNTER
Pt has seen the results in my chart , I returned call to pt to let her Dr Bolanos will be calling with results soon

## 2024-03-06 ENCOUNTER — TELEPHONE (OUTPATIENT)
Dept: ENDOCRINOLOGY | Facility: CLINIC | Age: 44
End: 2024-03-06
Payer: COMMERCIAL

## 2024-03-06 DIAGNOSIS — G90.A POTS (POSTURAL ORTHOSTATIC TACHYCARDIA SYNDROME): Primary | ICD-10-CM

## 2024-03-06 DIAGNOSIS — E03.8 HYPOTHYROIDISM DUE TO HASHIMOTO'S THYROIDITIS: ICD-10-CM

## 2024-03-06 DIAGNOSIS — E06.3 HYPOTHYROIDISM DUE TO HASHIMOTO'S THYROIDITIS: ICD-10-CM

## 2024-03-06 NOTE — TELEPHONE ENCOUNTER
Patient had Thyroid Labs 03/06/2024 TSH down to 0.213 from 6.7 . Patient has been 75 mcg of Levothyroxine for the last month . Patient is concerned about reducing dose completely and having a high increase in TSH again . She is wondering if she can alternate days or what the provider would suggest. Patient is also asking if she can have her Antibodies tested . Routing to provider for her consideration. Sasha Aceves LPN

## 2024-03-06 NOTE — TELEPHONE ENCOUNTER
We can continue same, and next time we will check TSH and antibodies  So I am placing the orders for her now

## 2024-03-13 ENCOUNTER — TELEPHONE (OUTPATIENT)
Dept: ENDOCRINOLOGY | Facility: CLINIC | Age: 44
End: 2024-03-13
Payer: COMMERCIAL

## 2024-03-13 NOTE — TELEPHONE ENCOUNTER
Outside Labs available for review , info received vis care everywhere . Thyroid Peroxidase antibody 596.7  Thyroglobulin Ab serum >2425 Sasha Aceves LPN

## 2024-03-14 DIAGNOSIS — E03.9 ACQUIRED HYPOTHYROIDISM: ICD-10-CM

## 2024-03-14 RX ORDER — FLUDROCORTISONE ACETATE 0.1 MG/1
0.1 TABLET ORAL DAILY
Qty: 60 TABLET | Refills: 3 | Status: SHIPPED | OUTPATIENT
Start: 2024-03-14

## 2024-03-19 ENCOUNTER — TELEPHONE (OUTPATIENT)
Dept: ENDOCRINOLOGY | Facility: CLINIC | Age: 44
End: 2024-03-19
Payer: COMMERCIAL

## 2024-04-04 ENCOUNTER — TELEPHONE (OUTPATIENT)
Dept: ENDOCRINOLOGY | Facility: CLINIC | Age: 44
End: 2024-04-04

## 2024-04-04 ENCOUNTER — APPOINTMENT (OUTPATIENT)
Dept: DERMATOLOGY | Facility: CLINIC | Age: 44
End: 2024-04-04
Payer: COMMERCIAL

## 2024-04-04 NOTE — TELEPHONE ENCOUNTER
Lab Results available under lab to review - TSH 0.267 T4 free 1.8 . Routing to provider to review. Sasha Aceves LPN

## 2024-05-06 DIAGNOSIS — E03.9 HYPOTHYROIDISM, UNSPECIFIED TYPE: ICD-10-CM

## 2024-05-06 RX ORDER — LEVOTHYROXINE SODIUM 75 UG/1
75 TABLET ORAL DAILY
Qty: 90 TABLET | Refills: 0 | Status: SHIPPED | OUTPATIENT
Start: 2024-05-06

## 2024-05-06 NOTE — TELEPHONE ENCOUNTER
Anastasia Plaza requesting refill for Synthroid . Order pended to provider . . Sasha Aceves LPN

## 2024-05-09 ENCOUNTER — APPOINTMENT (OUTPATIENT)
Dept: GASTROENTEROLOGY | Facility: CLINIC | Age: 44
End: 2024-05-09
Payer: COMMERCIAL

## 2024-05-14 ENCOUNTER — TELEPHONE (OUTPATIENT)
Dept: ENDOCRINOLOGY | Facility: CLINIC | Age: 44
End: 2024-05-14
Payer: COMMERCIAL

## 2024-05-14 NOTE — TELEPHONE ENCOUNTER
Outside labs for Anastasia Plaza received from [facility] lab  . Results available under lab tab  for provider to review. Sasha Aceves LPN

## 2024-07-05 DIAGNOSIS — G90.A POTS (POSTURAL ORTHOSTATIC TACHYCARDIA SYNDROME): Primary | ICD-10-CM

## 2024-07-05 RX ORDER — PROPRANOLOL HYDROCHLORIDE 10 MG/1
10 TABLET ORAL 2 TIMES DAILY PRN
Qty: 180 TABLET | Refills: 2 | Status: SHIPPED | OUTPATIENT
Start: 2024-07-05

## 2024-07-19 ENCOUNTER — TELEPHONE (OUTPATIENT)
Dept: ENDOCRINOLOGY | Facility: CLINIC | Age: 44
End: 2024-07-19
Payer: COMMERCIAL

## 2024-07-19 NOTE — TELEPHONE ENCOUNTER
Received labs from [facility]  Completed on 7/18/24  Available for your review in media: 7/19/24 : patient record: labs

## 2024-07-30 DIAGNOSIS — E03.9 HYPOTHYROIDISM, UNSPECIFIED TYPE: ICD-10-CM

## 2024-07-31 RX ORDER — LEVOTHYROXINE SODIUM 75 UG/1
75 TABLET ORAL DAILY
Qty: 30 TABLET | Refills: 0 | Status: SHIPPED | OUTPATIENT
Start: 2024-07-31

## 2024-08-30 ENCOUNTER — TELEPHONE (OUTPATIENT)
Dept: ENDOCRINOLOGY | Facility: CLINIC | Age: 44
End: 2024-08-30
Payer: COMMERCIAL

## 2024-08-30 NOTE — TELEPHONE ENCOUNTER
Received notification that patient labs were completed through [facility] and have resulted.  Updated Care Everywhere for results to be visible in patient chart. Results in labs dated 08/29/24

## 2024-09-03 DIAGNOSIS — E03.9 HYPOTHYROIDISM, UNSPECIFIED TYPE: ICD-10-CM

## 2024-09-03 RX ORDER — LEVOTHYROXINE SODIUM 75 UG/1
75 TABLET ORAL DAILY
Qty: 30 TABLET | Refills: 11 | Status: SHIPPED | OUTPATIENT
Start: 2024-09-03

## 2024-10-17 ENCOUNTER — TELEPHONE (OUTPATIENT)
Dept: ENDOCRINOLOGY | Facility: CLINIC | Age: 44
End: 2024-10-17
Payer: COMMERCIAL

## 2024-10-17 NOTE — TELEPHONE ENCOUNTER
Notice from [facility] that lab results are now available. Labs completed 10/14/24. Available for your review under labs.

## 2024-11-04 ENCOUNTER — PATIENT MESSAGE (OUTPATIENT)
Dept: ENDOCRINOLOGY | Facility: CLINIC | Age: 44
End: 2024-11-04
Payer: COMMERCIAL

## 2024-11-04 DIAGNOSIS — E03.9 ACQUIRED HYPOTHYROIDISM: ICD-10-CM

## 2024-11-04 RX ORDER — FLUDROCORTISONE ACETATE 0.1 MG/1
0.1 TABLET ORAL DAILY
Qty: 60 TABLET | Refills: 3 | Status: SHIPPED | OUTPATIENT
Start: 2024-11-04 | End: 2024-11-05 | Stop reason: SDUPTHER

## 2024-11-05 RX ORDER — FLUDROCORTISONE ACETATE 0.1 MG/1
0.1 TABLET ORAL DAILY
Qty: 60 TABLET | Refills: 3 | Status: SHIPPED | OUTPATIENT
Start: 2024-11-05 | End: 2024-11-08 | Stop reason: SDUPTHER

## 2024-11-08 ENCOUNTER — TELEPHONE (OUTPATIENT)
Dept: ENDOCRINOLOGY | Facility: CLINIC | Age: 44
End: 2024-11-08
Payer: COMMERCIAL

## 2024-11-08 DIAGNOSIS — E03.9 ACQUIRED HYPOTHYROIDISM: ICD-10-CM

## 2024-11-08 RX ORDER — FLUDROCORTISONE ACETATE 0.1 MG/1
0.1 TABLET ORAL DAILY
Qty: 90 TABLET | Refills: 3 | Status: SHIPPED | OUTPATIENT
Start: 2024-11-08 | End: 2024-11-08 | Stop reason: SDUPTHER

## 2024-11-08 RX ORDER — FLUDROCORTISONE ACETATE 0.1 MG/1
0.1 TABLET ORAL DAILY
Qty: 90 TABLET | Refills: 3 | Status: SHIPPED | OUTPATIENT
Start: 2024-11-08

## 2024-11-08 NOTE — TELEPHONE ENCOUNTER
Patient wanted to verify that the RX for Fluticortizone is submitted    Patient requesting new letter for Synthroid RX approval

## 2024-11-08 NOTE — TELEPHONE ENCOUNTER
Berenice, the medication was done, can you see if Sasha has the original letter we did, she might know the copy somewhere

## 2024-12-19 ENCOUNTER — APPOINTMENT (OUTPATIENT)
Dept: ENDOCRINOLOGY | Facility: CLINIC | Age: 44
End: 2024-12-19
Payer: COMMERCIAL

## 2024-12-19 DIAGNOSIS — E60 ZINC DEFICIENCY: ICD-10-CM

## 2024-12-19 DIAGNOSIS — G90.A POTS (POSTURAL ORTHOSTATIC TACHYCARDIA SYNDROME): ICD-10-CM

## 2024-12-19 DIAGNOSIS — E87.6 HYPOKALEMIA: Primary | ICD-10-CM

## 2024-12-19 DIAGNOSIS — E55.9 VITAMIN D DEFICIENCY: ICD-10-CM

## 2024-12-19 DIAGNOSIS — E04.1 THYROID NODULE: ICD-10-CM

## 2024-12-19 DIAGNOSIS — E83.42 HYPOMAGNESEMIA: ICD-10-CM

## 2024-12-19 DIAGNOSIS — E06.3 HYPOTHYROIDISM DUE TO HASHIMOTO'S THYROIDITIS: ICD-10-CM

## 2024-12-19 PROCEDURE — 1036F TOBACCO NON-USER: CPT | Performed by: INTERNAL MEDICINE

## 2024-12-19 PROCEDURE — 99214 OFFICE O/P EST MOD 30 MIN: CPT | Performed by: INTERNAL MEDICINE

## 2024-12-19 RX ORDER — PROPRANOLOL HYDROCHLORIDE 10 MG/1
10 TABLET ORAL 2 TIMES DAILY PRN
Qty: 180 TABLET | Refills: 2 | Status: SHIPPED | OUTPATIENT
Start: 2024-12-19

## 2024-12-19 ASSESSMENT — PATIENT HEALTH QUESTIONNAIRE - PHQ9
2. FEELING DOWN, DEPRESSED OR HOPELESS: NOT AT ALL
SUM OF ALL RESPONSES TO PHQ9 QUESTIONS 1 AND 2: 0
1. LITTLE INTEREST OR PLEASURE IN DOING THINGS: NOT AT ALL

## 2024-12-19 NOTE — PROGRESS NOTES
Consults    Reason For Consult  Hypothyroidism     History Of Present Illness  Anastasia Plaza is a 44 y.o. female presenting with hypothyroidism.     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       also , she is on Igo F for POTS   the control for hypothyroidism imroved      previous:   her POTS and hyperandrogenic symptoms with the thyroid replacement, we had to go very slow and she had reaction to non generic ( tachycardia, POTS /neurogenic symptoms )      she is working with Dr Rodriguez at [facility] , she also is doing some alternative medicine      she is still having reactions to increase on her medication   Any family history of thyroid cancer? no  Any personal history of radiation to your head/neck? no    Past Medical History  She has a past medical history of Hashimoto's thyroiditis, Hypothyroidism, Hypothyroidism, unspecified (2022), Personal history of other diseases of the circulatory system, Personal history of other diseases of the respiratory system (2015), Personal history of other infectious and parasitic diseases (2015), Personal history of other specified conditions (2015), Thyroid nodule, and Vitamin D deficiency.    Surgical History  She has a past surgical history that includes  section, classic (2016); Dilation and curettage of uterus (2016); and Norco tooth extraction.     Social History  She reports that she has never smoked. She has never been exposed to tobacco  smoke. She has never used smokeless tobacco. She reports that she does not currently use alcohol. She reports that she does not use drugs.    Family History  Family History   Problem Relation Name Age of Onset    Hypertension Father      Colon polyps Other mult family members     Hypertension Other Aunt     Breast cancer Cousin      Prostate cancer Maternal Grandfather N/A     Breast cancer Maternal Grandmother N/A     Thyroid disease Maternal Grandmother N/A     Breast cancer Mother's Sister N/A     Breast cancer Father's Sister N/A         Allergies  Levothyroxine, Penicillins, Sulfa (sulfonamide antibiotics), and Sulfamethoxazole-trimethoprim    Review of Systems    Past Medical History:   Diagnosis Date    Hashimoto's thyroiditis     Hypothyroidism     Hypothyroidism, unspecified 2022    Acquired hypothyroidism    Personal history of other diseases of the circulatory system     History of paroxysmal supraventricular tachycardia    Personal history of other diseases of the respiratory system 2015    History of acute sinusitis    Personal history of other infectious and parasitic diseases 2015    History of viral infection    Personal history of other specified conditions 2015    History of nausea    Thyroid nodule     Vitamin D deficiency        Past Surgical History:   Procedure Laterality Date     SECTION, CLASSIC  2016     Section    DILATION AND CURETTAGE OF UTERUS  2016    Dilation And Curettage    WISDOM TOOTH EXTRACTION         Social History     Socioeconomic History    Marital status: Single     Spouse name: Not on file    Number of children: Not on file    Years of education: Not on file    Highest education level: Not on file   Occupational History    Not on file   Tobacco Use    Smoking status: Never     Passive exposure: Never    Smokeless tobacco: Never   Substance and Sexual Activity    Alcohol use: Not Currently    Drug use: Never    Sexual  activity: Yes     Partners: Male   Other Topics Concern    Not on file   Social History Narrative    Not on file     Social Drivers of Health     Financial Resource Strain: Low Risk  (8/14/2024)    Received from [facility]    Overall Financial Resource Strain (CARDIA)     Difficulty of Paying Living Expenses: Not very hard   Food Insecurity: No Food Insecurity (8/14/2024)    Received from [facility]    Hunger Vital Sign     Worried About Running Out of Food in the Last Year: Never true     Ran Out of Food in the Last Year: Never true   Transportation Needs: Patient Declined (8/14/2024)    Received from [facility]    PRAPARE - Transportation     Lack of Transportation (Medical): Patient declined     Lack of Transportation (Non-Medical): Patient declined   Physical Activity: Insufficiently Active (8/14/2024)    Received from [facility]    Exercise Vital Sign     Days of Exercise per Week: 3 days     Minutes of Exercise per Session: 20 min   Stress: No Stress Concern Present (8/14/2024)    Received from [facility]    [employer] [place] [employer] - Occupational Stress Questionnaire     Feeling of Stress : Only a little   Social Connections: Unknown (8/14/2024)    Received from [facility]    Social Connection and Isolation Panel [NHANES]     Frequency of Communication with Friends and Family: More than three times a week     Frequency of Social Gatherings with Friends and Family: More than three times a week     Attends Congregation Services: More than 4 times per year     Active Member of Clubs or Organizations: Not on file     Attends Club or Organization Meetings: Patient declined     Marital Status:    Intimate Partner Violence: Not At Risk (2/23/2023)    Received from [facility], [facility]    [place] [facility] Domestic Violence     Do you feel threatened or afraid of others close to you?: No   Housing Stability: Low Risk  (2/28/2022)    Received from [facility]     Housing Stability Vital Sign     Unable to Pay for Housing in the Last Year: No     Number of Places Lived in the Last Year: 1     In the last 12 months, was there a time when you did not have a steady place to sleep or slept in a shelter (including now)?: No        Physical Exam     ROS, PMH, FH/SH, surgical history and allergies have been reviewed.    Last Recorded Vitals  There were no vitals taken for this visit.    Relevant Results         If you would like to pull in Medications, type .meds     If you would like to pull in Lab results for the last 24 hours, type .[id number]    If you would like to pull in specific Lab results, type .ll     If you would like to pull in Imaging results, type .[id number] :99}       Assessment/Plan   Problem List Items Addressed This Visit             ICD-10-CM    Hypokalemia - Primary E87.6    Relevant Orders    Thyroid Stimulating Hormone    Thyroxine, Free    Triiodothyronine, Free    Vitamin D 25-Hydroxy,Total (for eval of Vitamin D levels)    Basic metabolic panel    Zinc    Magnesium    Hypomagnesemia E83.42    Relevant Orders    Thyroid Stimulating Hormone    Thyroxine, Free    Triiodothyronine, Free    Vitamin D 25-Hydroxy,Total (for eval of Vitamin D levels)    Basic metabolic panel    Zinc    Magnesium    Hypothyroidism due to Hashimoto's thyroiditis E06.3    Relevant Orders    Thyroid Stimulating Hormone    Thyroxine, Free    Triiodothyronine, Free    Vitamin D 25-Hydroxy,Total (for eval of Vitamin D levels)    Basic metabolic panel    Zinc    Magnesium    POTS (postural orthostatic tachycardia syndrome) G90.A    Relevant Orders    Thyroid Stimulating Hormone    Thyroxine, Free    Triiodothyronine, Free    Vitamin D 25-Hydroxy,Total (for eval of Vitamin D levels)    Basic metabolic panel    Zinc    Magnesium    Thyroid nodule E04.1    Relevant Orders    Thyroid Stimulating Hormone    Thyroxine, Free    Triiodothyronine, Free    Vitamin D 25-Hydroxy,Total (for eval of  Vitamin D levels)    Basic metabolic panel    Zinc    Magnesium    Vitamin D deficiency E55.9    Relevant Orders    Thyroid Stimulating Hormone    Thyroxine, Free    Triiodothyronine, Free    Vitamin D 25-Hydroxy,Total (for eval of Vitamin D levels)    Basic metabolic panel    Zinc    Magnesium    Zinc deficiency E60    Relevant Orders    Thyroid Stimulating Hormone    Thyroxine, Free    Triiodothyronine, Free    Vitamin D 25-Hydroxy,Total (for eval of Vitamin D levels)    Basic metabolic panel    Zinc    Magnesium     Order refill done  Labs done            I spent a total of 30+ minutes on the date of the service which included preparing to see the patient, face-to-face patient care, completing clinical documentation, obtaining and / or reviewing separately obtained history, counseling and educating the patient/family/caregiver, ordering medications, tests, or procedures, communicating with other healthcare providers (not separately reported), independently interpreting results, not separately reported, and communicating results to the patient/family/caregiver, real-time telemedicine visit using audiovisual technology with patient's verbal consent.  Name and date of birth verified.        Brice Hays MD

## 2025-03-15 ENCOUNTER — PATIENT MESSAGE (OUTPATIENT)
Dept: ENDOCRINOLOGY | Facility: CLINIC | Age: 45
End: 2025-03-15
Payer: COMMERCIAL

## 2025-03-15 DIAGNOSIS — E87.6 HYPOKALEMIA: ICD-10-CM

## 2025-03-17 RX ORDER — POTASSIUM CHLORIDE 750 MG/1
10 TABLET, FILM COATED, EXTENDED RELEASE ORAL
Qty: 90 TABLET | Refills: 3 | Status: SHIPPED | OUTPATIENT
Start: 2025-03-17

## 2025-03-27 ENCOUNTER — TELEPHONE (OUTPATIENT)
Dept: ENDOCRINOLOGY | Facility: CLINIC | Age: 45
End: 2025-03-27
Payer: COMMERCIAL

## 2025-04-14 DIAGNOSIS — K14.6 BURNING MOUTH SYNDROME: Primary | ICD-10-CM

## 2025-04-27 LAB
NIACIN SERPL-MCNC: <20 NG/ML
NICOTINAMIDE SERPL-MCNC: <20 NG/ML
PYRIDOXAL PHOS SERPL-MCNC: 18.3 NG/ML (ref 2.1–21.7)
VIT B1 BLD-SCNC: NORMAL NMOL/L

## 2025-05-02 LAB
NIACIN SERPL-MCNC: <20 NG/ML
NICOTINAMIDE SERPL-MCNC: <20 NG/ML
PYRIDOXAL PHOS SERPL-MCNC: 18.3 NG/ML (ref 2.1–21.7)
VIT B1 BLD-SCNC: 114 NMOL/L (ref 78–185)

## 2025-07-15 ENCOUNTER — TELEPHONE (OUTPATIENT)
Dept: ENDOCRINOLOGY | Facility: CLINIC | Age: 45
End: 2025-07-15
Payer: COMMERCIAL

## 2025-07-15 NOTE — TELEPHONE ENCOUNTER
L/V 12-29-24   No future appt scheduled  Lab results drawn 7-11-25 came over fax for Free T3 and T4 has been uploaded into chart for review. Patient is currently taking Synthyroid 75 mcg tablets daily and uses [facility] in [place] [phone].

## 2025-07-30 DIAGNOSIS — E55.9 VITAMIN D DEFICIENCY: ICD-10-CM

## 2025-07-30 DIAGNOSIS — G90.A POTS (POSTURAL ORTHOSTATIC TACHYCARDIA SYNDROME): ICD-10-CM

## 2025-07-30 DIAGNOSIS — E87.6 HYPOKALEMIA: Primary | ICD-10-CM

## 2025-07-30 DIAGNOSIS — E04.1 THYROID NODULE: ICD-10-CM

## 2025-07-30 DIAGNOSIS — E83.42 HYPOMAGNESEMIA: ICD-10-CM

## 2025-07-30 DIAGNOSIS — E60 ZINC DEFICIENCY: ICD-10-CM

## 2025-07-30 DIAGNOSIS — E06.3 HYPOTHYROIDISM DUE TO HASHIMOTO'S THYROIDITIS: ICD-10-CM

## 2025-07-30 DIAGNOSIS — E28.319 PREMATURE MENOPAUSE: ICD-10-CM

## 2025-07-31 DIAGNOSIS — E04.1 THYROID NODULE: ICD-10-CM

## 2025-07-31 DIAGNOSIS — E83.42 HYPOMAGNESEMIA: ICD-10-CM

## 2025-07-31 DIAGNOSIS — G90.A POTS (POSTURAL ORTHOSTATIC TACHYCARDIA SYNDROME): ICD-10-CM

## 2025-07-31 DIAGNOSIS — E87.6 HYPOKALEMIA: ICD-10-CM

## 2025-07-31 DIAGNOSIS — E28.319 PREMATURE MENOPAUSE: ICD-10-CM

## 2025-07-31 DIAGNOSIS — E60 ZINC DEFICIENCY: ICD-10-CM

## 2025-07-31 DIAGNOSIS — E55.9 VITAMIN D DEFICIENCY: ICD-10-CM

## 2025-07-31 DIAGNOSIS — E06.3 HYPOTHYROIDISM DUE TO HASHIMOTO'S THYROIDITIS: ICD-10-CM

## 2025-08-01 DIAGNOSIS — E04.1 THYROID NODULE: ICD-10-CM

## 2025-08-01 DIAGNOSIS — E87.6 HYPOKALEMIA: ICD-10-CM

## 2025-08-01 DIAGNOSIS — E60 ZINC DEFICIENCY: ICD-10-CM

## 2025-08-01 DIAGNOSIS — E55.9 VITAMIN D DEFICIENCY: ICD-10-CM

## 2025-08-01 DIAGNOSIS — E28.319 PREMATURE MENOPAUSE: ICD-10-CM

## 2025-08-01 DIAGNOSIS — E06.3 HYPOTHYROIDISM DUE TO HASHIMOTO'S THYROIDITIS: ICD-10-CM

## 2025-08-01 DIAGNOSIS — E83.42 HYPOMAGNESEMIA: ICD-10-CM

## 2025-08-01 DIAGNOSIS — G90.A POTS (POSTURAL ORTHOSTATIC TACHYCARDIA SYNDROME): ICD-10-CM

## 2025-08-02 DIAGNOSIS — E28.319 PREMATURE MENOPAUSE: ICD-10-CM

## 2025-08-02 DIAGNOSIS — G90.A POTS (POSTURAL ORTHOSTATIC TACHYCARDIA SYNDROME): ICD-10-CM

## 2025-08-02 DIAGNOSIS — E04.1 THYROID NODULE: ICD-10-CM

## 2025-08-02 DIAGNOSIS — E55.9 VITAMIN D DEFICIENCY: ICD-10-CM

## 2025-08-02 DIAGNOSIS — E87.6 HYPOKALEMIA: ICD-10-CM

## 2025-08-02 DIAGNOSIS — E83.42 HYPOMAGNESEMIA: ICD-10-CM

## 2025-08-02 DIAGNOSIS — E06.3 HYPOTHYROIDISM DUE TO HASHIMOTO'S THYROIDITIS: ICD-10-CM

## 2025-08-02 DIAGNOSIS — E60 ZINC DEFICIENCY: ICD-10-CM

## 2025-08-03 DIAGNOSIS — E55.9 VITAMIN D DEFICIENCY: ICD-10-CM

## 2025-08-03 DIAGNOSIS — E06.3 HYPOTHYROIDISM DUE TO HASHIMOTO'S THYROIDITIS: ICD-10-CM

## 2025-08-03 DIAGNOSIS — E04.1 THYROID NODULE: ICD-10-CM

## 2025-08-03 DIAGNOSIS — G90.A POTS (POSTURAL ORTHOSTATIC TACHYCARDIA SYNDROME): ICD-10-CM

## 2025-08-03 DIAGNOSIS — E87.6 HYPOKALEMIA: ICD-10-CM

## 2025-08-03 DIAGNOSIS — E60 ZINC DEFICIENCY: ICD-10-CM

## 2025-08-03 DIAGNOSIS — E28.319 PREMATURE MENOPAUSE: ICD-10-CM

## 2025-08-03 DIAGNOSIS — E83.42 HYPOMAGNESEMIA: ICD-10-CM

## 2025-08-04 DIAGNOSIS — E55.9 VITAMIN D DEFICIENCY: ICD-10-CM

## 2025-08-04 DIAGNOSIS — E87.6 HYPOKALEMIA: ICD-10-CM

## 2025-08-04 DIAGNOSIS — E04.1 THYROID NODULE: ICD-10-CM

## 2025-08-04 DIAGNOSIS — E60 ZINC DEFICIENCY: ICD-10-CM

## 2025-08-04 DIAGNOSIS — E06.3 HYPOTHYROIDISM DUE TO HASHIMOTO'S THYROIDITIS: ICD-10-CM

## 2025-08-04 DIAGNOSIS — E83.42 HYPOMAGNESEMIA: ICD-10-CM

## 2025-08-04 DIAGNOSIS — G90.A POTS (POSTURAL ORTHOSTATIC TACHYCARDIA SYNDROME): ICD-10-CM

## 2025-08-04 DIAGNOSIS — E28.319 PREMATURE MENOPAUSE: ICD-10-CM

## 2025-08-16 DIAGNOSIS — G90.A POTS (POSTURAL ORTHOSTATIC TACHYCARDIA SYNDROME): ICD-10-CM

## 2025-08-18 RX ORDER — PROPRANOLOL HYDROCHLORIDE 10 MG/1
TABLET ORAL
Qty: 180 TABLET | Refills: 3 | Status: SHIPPED | OUTPATIENT
Start: 2025-08-18

## 2025-08-24 DIAGNOSIS — E03.9 HYPOTHYROIDISM, UNSPECIFIED TYPE: ICD-10-CM

## 2025-08-25 RX ORDER — LEVOTHYROXINE SODIUM 75 UG/1
75 TABLET ORAL DAILY
Qty: 30 TABLET | Refills: 4 | Status: SHIPPED | OUTPATIENT
Start: 2025-08-25